# Patient Record
Sex: FEMALE | Race: WHITE | NOT HISPANIC OR LATINO | Employment: OTHER | URBAN - METROPOLITAN AREA
[De-identification: names, ages, dates, MRNs, and addresses within clinical notes are randomized per-mention and may not be internally consistent; named-entity substitution may affect disease eponyms.]

---

## 2022-10-27 RX ORDER — GEMFIBROZIL 600 MG/1
600 TABLET, FILM COATED ORAL
Status: ON HOLD | COMMUNITY
Start: 2021-11-11 | End: 2022-11-01

## 2022-10-27 RX ORDER — ROPINIROLE 1 MG/1
1 TABLET, FILM COATED ORAL AT BEDTIME
COMMUNITY
Start: 2022-02-14

## 2022-10-27 RX ORDER — LOSARTAN POTASSIUM 25 MG/1
25 TABLET ORAL DAILY
COMMUNITY
Start: 2022-06-28

## 2022-10-27 RX ORDER — ALBUTEROL SULFATE 90 UG/1
2 AEROSOL, METERED RESPIRATORY (INHALATION) 4 TIMES DAILY PRN
COMMUNITY
Start: 2022-06-26

## 2022-10-27 RX ORDER — LIRAGLUTIDE 6 MG/ML
1.8 INJECTION SUBCUTANEOUS DAILY
COMMUNITY
Start: 2022-10-13

## 2022-10-27 RX ORDER — METOPROLOL SUCCINATE 50 MG/1
50 TABLET, EXTENDED RELEASE ORAL DAILY
COMMUNITY
Start: 2021-11-11

## 2022-10-27 RX ORDER — ESCITALOPRAM OXALATE 10 MG/1
10 TABLET ORAL DAILY
COMMUNITY
Start: 2022-06-28

## 2022-10-30 ENCOUNTER — LAB (OUTPATIENT)
Dept: FAMILY MEDICINE | Facility: CLINIC | Age: 62
End: 2022-10-30
Payer: COMMERCIAL

## 2022-10-30 DIAGNOSIS — Z20.822 ENCOUNTER FOR LABORATORY TESTING FOR COVID-19 VIRUS: ICD-10-CM

## 2022-10-30 LAB — SARS-COV-2 RNA RESP QL NAA+PROBE: NEGATIVE

## 2022-10-30 PROCEDURE — U0005 INFEC AGEN DETEC AMPLI PROBE: HCPCS

## 2022-10-30 PROCEDURE — 99207 PR NO CHARGE LOS: CPT

## 2022-10-30 PROCEDURE — U0003 INFECTIOUS AGENT DETECTION BY NUCLEIC ACID (DNA OR RNA); SEVERE ACUTE RESPIRATORY SYNDROME CORONAVIRUS 2 (SARS-COV-2) (CORONAVIRUS DISEASE [COVID-19]), AMPLIFIED PROBE TECHNIQUE, MAKING USE OF HIGH THROUGHPUT TECHNOLOGIES AS DESCRIBED BY CMS-2020-01-R: HCPCS

## 2022-10-31 ENCOUNTER — TRANSFERRED RECORDS (OUTPATIENT)
Dept: HEALTH INFORMATION MANAGEMENT | Facility: CLINIC | Age: 62
End: 2022-10-31

## 2022-10-31 ENCOUNTER — ANESTHESIA EVENT (OUTPATIENT)
Dept: SURGERY | Facility: CLINIC | Age: 62
End: 2022-10-31
Payer: COMMERCIAL
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
## 2022-11-01 ENCOUNTER — APPOINTMENT (OUTPATIENT)
Dept: GENERAL RADIOLOGY | Facility: CLINIC | Age: 62
End: 2022-11-01
Attending: ORTHOPAEDIC SURGERY
Payer: COMMERCIAL

## 2022-11-01 ENCOUNTER — HOSPITAL ENCOUNTER (OUTPATIENT)
Facility: CLINIC | Age: 62
Discharge: HOME | End: 2022-11-02
Attending: ORTHOPAEDIC SURGERY | Admitting: ORTHOPAEDIC SURGERY
Payer: COMMERCIAL

## 2022-11-01 ENCOUNTER — ANESTHESIA (OUTPATIENT)
Dept: SURGERY | Facility: CLINIC | Age: 62
End: 2022-11-01
Payer: COMMERCIAL

## 2022-11-01 DIAGNOSIS — Z96.641 STATUS POST RIGHT HIP REPLACEMENT: Primary | ICD-10-CM

## 2022-11-01 LAB
CREAT SERPL-MCNC: 0.54 MG/DL (ref 0.51–0.95)
GFR SERPL CREATININE-BSD FRML MDRD: >90 ML/MIN/1.73M2
GLUCOSE BLDC GLUCOMTR-MCNC: 94 MG/DL (ref 70–99)
HOLD SPECIMEN: NORMAL

## 2022-11-01 PROCEDURE — 271N000001 HC OR GENERAL SUPPLY NON-STERILE: Performed by: ORTHOPAEDIC SURGERY

## 2022-11-01 PROCEDURE — 93005 ELECTROCARDIOGRAM TRACING: CPT

## 2022-11-01 PROCEDURE — C1776 JOINT DEVICE (IMPLANTABLE): HCPCS | Performed by: ORTHOPAEDIC SURGERY

## 2022-11-01 PROCEDURE — 250N000013 HC RX MED GY IP 250 OP 250 PS 637: Performed by: ORTHOPAEDIC SURGERY

## 2022-11-01 PROCEDURE — 250N000013 HC RX MED GY IP 250 OP 250 PS 637: Performed by: NURSE ANESTHETIST, CERTIFIED REGISTERED

## 2022-11-01 PROCEDURE — 999N000141 HC STATISTIC PRE-PROCEDURE NURSING ASSESSMENT: Performed by: ORTHOPAEDIC SURGERY

## 2022-11-01 PROCEDURE — 250N000011 HC RX IP 250 OP 636: Performed by: PHYSICIAN ASSISTANT

## 2022-11-01 PROCEDURE — 710N000010 HC RECOVERY PHASE 1, LEVEL 2, PER MIN: Performed by: ORTHOPAEDIC SURGERY

## 2022-11-01 PROCEDURE — 250N000011 HC RX IP 250 OP 636

## 2022-11-01 PROCEDURE — 258N000003 HC RX IP 258 OP 636

## 2022-11-01 PROCEDURE — 370N000017 HC ANESTHESIA TECHNICAL FEE, PER MIN: Performed by: ORTHOPAEDIC SURGERY

## 2022-11-01 PROCEDURE — 250N000009 HC RX 250: Performed by: ORTHOPAEDIC SURGERY

## 2022-11-01 PROCEDURE — 36415 COLL VENOUS BLD VENIPUNCTURE: CPT | Performed by: ORTHOPAEDIC SURGERY

## 2022-11-01 PROCEDURE — 258N000003 HC RX IP 258 OP 636: Performed by: PHYSICIAN ASSISTANT

## 2022-11-01 PROCEDURE — 82962 GLUCOSE BLOOD TEST: CPT

## 2022-11-01 PROCEDURE — 258N000003 HC RX IP 258 OP 636: Performed by: ORTHOPAEDIC SURGERY

## 2022-11-01 PROCEDURE — 250N000013 HC RX MED GY IP 250 OP 250 PS 637

## 2022-11-01 PROCEDURE — 250N000011 HC RX IP 250 OP 636: Performed by: ORTHOPAEDIC SURGERY

## 2022-11-01 PROCEDURE — 360N000077 HC SURGERY LEVEL 4, PER MIN: Performed by: ORTHOPAEDIC SURGERY

## 2022-11-01 PROCEDURE — 82565 ASSAY OF CREATININE: CPT | Performed by: ORTHOPAEDIC SURGERY

## 2022-11-01 PROCEDURE — 85018 HEMOGLOBIN: CPT | Performed by: ORTHOPAEDIC SURGERY

## 2022-11-01 PROCEDURE — 272N000001 HC OR GENERAL SUPPLY STERILE: Performed by: ORTHOPAEDIC SURGERY

## 2022-11-01 PROCEDURE — 250N000013 HC RX MED GY IP 250 OP 250 PS 637: Performed by: PHYSICIAN ASSISTANT

## 2022-11-01 PROCEDURE — 72170 X-RAY EXAM OF PELVIS: CPT

## 2022-11-01 DEVICE — IMP STEM FEM DEPUY ACTIS STD COLLAR TPR SZ 4MM 1010-11-040: Type: IMPLANTABLE DEVICE | Site: HIP | Status: FUNCTIONAL

## 2022-11-01 DEVICE — HEAD FEMORAL 36MM +5MML NECK BIOLOX DELTA CERAMIC HIP 12: Type: IMPLANTABLE DEVICE | Site: HIP | Status: FUNCTIONAL

## 2022-11-01 DEVICE — IMP CUP ACE PINNACLE 52MM 1217-22-052: Type: IMPLANTABLE DEVICE | Site: HIP | Status: FUNCTIONAL

## 2022-11-01 DEVICE — IMP INSERT HIP DEPUY PINNACLE ALTRX 36X52MM 1221-36-052: Type: IMPLANTABLE DEVICE | Site: HIP | Status: FUNCTIONAL

## 2022-11-01 RX ORDER — SODIUM CHLORIDE, SODIUM LACTATE, POTASSIUM CHLORIDE, CALCIUM CHLORIDE 600; 310; 30; 20 MG/100ML; MG/100ML; MG/100ML; MG/100ML
INJECTION, SOLUTION INTRAVENOUS CONTINUOUS
Status: DISCONTINUED | OUTPATIENT
Start: 2022-11-01 | End: 2022-11-01 | Stop reason: HOSPADM

## 2022-11-01 RX ORDER — PROCHLORPERAZINE MALEATE 5 MG
10 TABLET ORAL EVERY 6 HOURS PRN
Status: DISCONTINUED | OUTPATIENT
Start: 2022-11-01 | End: 2022-11-02 | Stop reason: HOSPADM

## 2022-11-01 RX ORDER — ESCITALOPRAM OXALATE 10 MG/1
10 TABLET ORAL DAILY
Status: DISCONTINUED | OUTPATIENT
Start: 2022-11-02 | End: 2022-11-02 | Stop reason: HOSPADM

## 2022-11-01 RX ORDER — ACETAMINOPHEN 325 MG/1
975 TABLET ORAL ONCE
Status: COMPLETED | OUTPATIENT
Start: 2022-11-01 | End: 2022-11-01

## 2022-11-01 RX ORDER — HYDROXYZINE HYDROCHLORIDE 25 MG/1
25 TABLET, FILM COATED ORAL EVERY 6 HOURS PRN
Status: DISCONTINUED | OUTPATIENT
Start: 2022-11-01 | End: 2022-11-02 | Stop reason: HOSPADM

## 2022-11-01 RX ORDER — KETOROLAC TROMETHAMINE 15 MG/ML
15 INJECTION, SOLUTION INTRAMUSCULAR; INTRAVENOUS EVERY 6 HOURS
Status: COMPLETED | OUTPATIENT
Start: 2022-11-01 | End: 2022-11-02

## 2022-11-01 RX ORDER — DEXAMETHASONE SODIUM PHOSPHATE 4 MG/ML
INJECTION, SOLUTION INTRA-ARTICULAR; INTRALESIONAL; INTRAMUSCULAR; INTRAVENOUS; SOFT TISSUE PRN
Status: DISCONTINUED | OUTPATIENT
Start: 2022-11-01 | End: 2022-11-01

## 2022-11-01 RX ORDER — HYDROMORPHONE HCL IN WATER/PF 6 MG/30 ML
0.2 PATIENT CONTROLLED ANALGESIA SYRINGE INTRAVENOUS EVERY 5 MIN PRN
Status: DISCONTINUED | OUTPATIENT
Start: 2022-11-01 | End: 2022-11-01 | Stop reason: HOSPADM

## 2022-11-01 RX ORDER — METOPROLOL SUCCINATE 50 MG/1
50 TABLET, EXTENDED RELEASE ORAL DAILY
Status: DISCONTINUED | OUTPATIENT
Start: 2022-11-02 | End: 2022-11-02 | Stop reason: HOSPADM

## 2022-11-01 RX ORDER — FENTANYL CITRATE 50 UG/ML
25 INJECTION, SOLUTION INTRAMUSCULAR; INTRAVENOUS EVERY 5 MIN PRN
Status: DISCONTINUED | OUTPATIENT
Start: 2022-11-01 | End: 2022-11-01 | Stop reason: HOSPADM

## 2022-11-01 RX ORDER — ACETAMINOPHEN 325 MG/1
650 TABLET ORAL EVERY 4 HOURS PRN
Qty: 100 TABLET | Refills: 0
Start: 2022-11-01

## 2022-11-01 RX ORDER — ONDANSETRON 2 MG/ML
4 INJECTION INTRAMUSCULAR; INTRAVENOUS EVERY 30 MIN PRN
Status: DISCONTINUED | OUTPATIENT
Start: 2022-11-01 | End: 2022-11-01 | Stop reason: HOSPADM

## 2022-11-01 RX ORDER — ACETAMINOPHEN 325 MG/1
975 TABLET ORAL EVERY 8 HOURS
Status: DISCONTINUED | OUTPATIENT
Start: 2022-11-01 | End: 2022-11-02 | Stop reason: HOSPADM

## 2022-11-01 RX ORDER — AMOXICILLIN 250 MG
1 CAPSULE ORAL 2 TIMES DAILY
Status: DISCONTINUED | OUTPATIENT
Start: 2022-11-01 | End: 2022-11-02 | Stop reason: HOSPADM

## 2022-11-01 RX ORDER — CALCIUM CARBONATE 500 MG/1
500 TABLET, CHEWABLE ORAL 4 TIMES DAILY PRN
Status: DISCONTINUED | OUTPATIENT
Start: 2022-11-01 | End: 2022-11-02 | Stop reason: HOSPADM

## 2022-11-01 RX ORDER — ROPINIROLE 1 MG/1
1 TABLET, FILM COATED ORAL AT BEDTIME
Status: DISCONTINUED | OUTPATIENT
Start: 2022-11-01 | End: 2022-11-02 | Stop reason: HOSPADM

## 2022-11-01 RX ORDER — OXYCODONE HYDROCHLORIDE 5 MG/1
5-10 TABLET ORAL EVERY 4 HOURS PRN
Qty: 26 TABLET | Refills: 0 | Status: SHIPPED | OUTPATIENT
Start: 2022-11-01

## 2022-11-01 RX ORDER — SODIUM CHLORIDE, SODIUM LACTATE, POTASSIUM CHLORIDE, CALCIUM CHLORIDE 600; 310; 30; 20 MG/100ML; MG/100ML; MG/100ML; MG/100ML
INJECTION, SOLUTION INTRAVENOUS CONTINUOUS
Status: DISCONTINUED | OUTPATIENT
Start: 2022-11-01 | End: 2022-11-02 | Stop reason: HOSPADM

## 2022-11-01 RX ORDER — CEFAZOLIN SODIUM 2 G/100ML
2 INJECTION, SOLUTION INTRAVENOUS EVERY 8 HOURS
Status: COMPLETED | OUTPATIENT
Start: 2022-11-01 | End: 2022-11-02

## 2022-11-01 RX ORDER — CEFAZOLIN SODIUM/WATER 2 G/20 ML
2 SYRINGE (ML) INTRAVENOUS
Status: COMPLETED | OUTPATIENT
Start: 2022-11-01 | End: 2022-11-01

## 2022-11-01 RX ORDER — BUPIVACAINE HYDROCHLORIDE 7.5 MG/ML
INJECTION, SOLUTION INTRASPINAL
Status: COMPLETED | OUTPATIENT
Start: 2022-11-01 | End: 2022-11-01

## 2022-11-01 RX ORDER — ONDANSETRON 4 MG/1
4 TABLET, ORALLY DISINTEGRATING ORAL EVERY 30 MIN PRN
Status: DISCONTINUED | OUTPATIENT
Start: 2022-11-01 | End: 2022-11-01 | Stop reason: HOSPADM

## 2022-11-01 RX ORDER — BUPIVACAINE HYDROCHLORIDE 5 MG/ML
INJECTION, SOLUTION PERINEURAL PRN
Status: DISCONTINUED | OUTPATIENT
Start: 2022-11-01 | End: 2022-11-01 | Stop reason: HOSPADM

## 2022-11-01 RX ORDER — HYDROXYZINE HYDROCHLORIDE 25 MG/1
25 TABLET, FILM COATED ORAL EVERY 6 HOURS PRN
Qty: 30 TABLET | Refills: 0 | Status: SHIPPED | OUTPATIENT
Start: 2022-11-01

## 2022-11-01 RX ORDER — ONDANSETRON 4 MG/1
4 TABLET, ORALLY DISINTEGRATING ORAL EVERY 6 HOURS PRN
Status: DISCONTINUED | OUTPATIENT
Start: 2022-11-01 | End: 2022-11-02 | Stop reason: HOSPADM

## 2022-11-01 RX ORDER — LIDOCAINE 40 MG/G
CREAM TOPICAL
Status: DISCONTINUED | OUTPATIENT
Start: 2022-11-01 | End: 2022-11-02 | Stop reason: HOSPADM

## 2022-11-01 RX ORDER — MEPERIDINE HYDROCHLORIDE 25 MG/ML
12.5 INJECTION INTRAMUSCULAR; INTRAVENOUS; SUBCUTANEOUS
Status: DISCONTINUED | OUTPATIENT
Start: 2022-11-01 | End: 2022-11-01 | Stop reason: HOSPADM

## 2022-11-01 RX ORDER — POLYETHYLENE GLYCOL 3350 17 G/17G
17 POWDER, FOR SOLUTION ORAL DAILY
Status: DISCONTINUED | OUTPATIENT
Start: 2022-11-02 | End: 2022-11-02 | Stop reason: HOSPADM

## 2022-11-01 RX ORDER — LIRAGLUTIDE 6 MG/ML
1.8 INJECTION SUBCUTANEOUS DAILY
Status: DISCONTINUED | OUTPATIENT
Start: 2022-11-02 | End: 2022-11-02 | Stop reason: HOSPADM

## 2022-11-01 RX ORDER — NALOXONE HYDROCHLORIDE 0.4 MG/ML
0.2 INJECTION, SOLUTION INTRAMUSCULAR; INTRAVENOUS; SUBCUTANEOUS
Status: DISCONTINUED | OUTPATIENT
Start: 2022-11-01 | End: 2022-11-02 | Stop reason: HOSPADM

## 2022-11-01 RX ORDER — HYDROMORPHONE HCL IN WATER/PF 6 MG/30 ML
0.4 PATIENT CONTROLLED ANALGESIA SYRINGE INTRAVENOUS
Status: DISCONTINUED | OUTPATIENT
Start: 2022-11-01 | End: 2022-11-02 | Stop reason: HOSPADM

## 2022-11-01 RX ORDER — CEFAZOLIN SODIUM/WATER 2 G/20 ML
2 SYRINGE (ML) INTRAVENOUS SEE ADMIN INSTRUCTIONS
Status: DISCONTINUED | OUTPATIENT
Start: 2022-11-01 | End: 2022-11-01 | Stop reason: HOSPADM

## 2022-11-01 RX ORDER — GABAPENTIN 300 MG/1
300 CAPSULE ORAL
Status: COMPLETED | OUTPATIENT
Start: 2022-11-01 | End: 2022-11-01

## 2022-11-01 RX ORDER — LOSARTAN POTASSIUM 25 MG/1
25 TABLET ORAL DAILY
Status: DISCONTINUED | OUTPATIENT
Start: 2022-11-02 | End: 2022-11-02 | Stop reason: HOSPADM

## 2022-11-01 RX ORDER — NALOXONE HYDROCHLORIDE 0.4 MG/ML
0.4 INJECTION, SOLUTION INTRAMUSCULAR; INTRAVENOUS; SUBCUTANEOUS
Status: DISCONTINUED | OUTPATIENT
Start: 2022-11-01 | End: 2022-11-02 | Stop reason: HOSPADM

## 2022-11-01 RX ORDER — LIDOCAINE 40 MG/G
CREAM TOPICAL
Status: DISCONTINUED | OUTPATIENT
Start: 2022-11-01 | End: 2022-11-01 | Stop reason: HOSPADM

## 2022-11-01 RX ORDER — HYDROMORPHONE HCL IN WATER/PF 6 MG/30 ML
0.2 PATIENT CONTROLLED ANALGESIA SYRINGE INTRAVENOUS
Status: DISCONTINUED | OUTPATIENT
Start: 2022-11-01 | End: 2022-11-02 | Stop reason: HOSPADM

## 2022-11-01 RX ORDER — ONDANSETRON 2 MG/ML
INJECTION INTRAMUSCULAR; INTRAVENOUS PRN
Status: DISCONTINUED | OUTPATIENT
Start: 2022-11-01 | End: 2022-11-01

## 2022-11-01 RX ORDER — BISACODYL 10 MG
10 SUPPOSITORY, RECTAL RECTAL DAILY PRN
Status: DISCONTINUED | OUTPATIENT
Start: 2022-11-01 | End: 2022-11-02 | Stop reason: HOSPADM

## 2022-11-01 RX ORDER — AMOXICILLIN 250 MG
1-2 CAPSULE ORAL 2 TIMES DAILY PRN
Qty: 30 TABLET | Refills: 0 | Status: SHIPPED | OUTPATIENT
Start: 2022-11-01

## 2022-11-01 RX ORDER — FAMOTIDINE 20 MG/1
20 TABLET, FILM COATED ORAL 2 TIMES DAILY
Status: DISCONTINUED | OUTPATIENT
Start: 2022-11-01 | End: 2022-11-02 | Stop reason: HOSPADM

## 2022-11-01 RX ORDER — GLUCOSAMINE HCL/CHONDROITIN SU 500-400 MG
CAPSULE ORAL DAILY
COMMUNITY

## 2022-11-01 RX ORDER — FENTANYL CITRATE 50 UG/ML
25 INJECTION, SOLUTION INTRAMUSCULAR; INTRAVENOUS
Status: CANCELLED | OUTPATIENT
Start: 2022-11-01

## 2022-11-01 RX ORDER — OXYCODONE HYDROCHLORIDE 5 MG/1
10 TABLET ORAL EVERY 4 HOURS PRN
Status: DISCONTINUED | OUTPATIENT
Start: 2022-11-01 | End: 2022-11-02 | Stop reason: HOSPADM

## 2022-11-01 RX ORDER — ACETAMINOPHEN 325 MG/1
650 TABLET ORAL EVERY 4 HOURS PRN
Status: DISCONTINUED | OUTPATIENT
Start: 2022-11-04 | End: 2022-11-02 | Stop reason: HOSPADM

## 2022-11-01 RX ORDER — OXYCODONE HYDROCHLORIDE 5 MG/1
5 TABLET ORAL EVERY 4 HOURS PRN
Status: DISCONTINUED | OUTPATIENT
Start: 2022-11-01 | End: 2022-11-01 | Stop reason: HOSPADM

## 2022-11-01 RX ORDER — ONDANSETRON 2 MG/ML
4 INJECTION INTRAMUSCULAR; INTRAVENOUS EVERY 6 HOURS PRN
Status: DISCONTINUED | OUTPATIENT
Start: 2022-11-01 | End: 2022-11-02 | Stop reason: HOSPADM

## 2022-11-01 RX ORDER — TRANEXAMIC ACID 650 MG/1
1950 TABLET ORAL ONCE
Status: COMPLETED | OUTPATIENT
Start: 2022-11-01 | End: 2022-11-01

## 2022-11-01 RX ORDER — PROPOFOL 10 MG/ML
INJECTION, EMULSION INTRAVENOUS CONTINUOUS PRN
Status: DISCONTINUED | OUTPATIENT
Start: 2022-11-01 | End: 2022-11-01

## 2022-11-01 RX ORDER — OXYCODONE HYDROCHLORIDE 5 MG/1
5 TABLET ORAL EVERY 4 HOURS PRN
Status: DISCONTINUED | OUTPATIENT
Start: 2022-11-01 | End: 2022-11-02 | Stop reason: HOSPADM

## 2022-11-01 RX ADMIN — SODIUM CHLORIDE, POTASSIUM CHLORIDE, SODIUM LACTATE AND CALCIUM CHLORIDE 1000 ML: 600; 310; 30; 20 INJECTION, SOLUTION INTRAVENOUS at 22:07

## 2022-11-01 RX ADMIN — ASPIRIN 325 MG: 325 TABLET, COATED ORAL at 17:34

## 2022-11-01 RX ADMIN — PROCHLORPERAZINE EDISYLATE 10 MG: 5 INJECTION INTRAMUSCULAR; INTRAVENOUS at 21:49

## 2022-11-01 RX ADMIN — SODIUM CHLORIDE, POTASSIUM CHLORIDE, SODIUM LACTATE AND CALCIUM CHLORIDE: 600; 310; 30; 20 INJECTION, SOLUTION INTRAVENOUS at 22:42

## 2022-11-01 RX ADMIN — PHENYLEPHRINE HYDROCHLORIDE 100 MCG: 10 INJECTION INTRAVENOUS at 16:03

## 2022-11-01 RX ADMIN — BUPIVACAINE HYDROCHLORIDE IN DEXTROSE 1.5 ML: 7.5 INJECTION, SOLUTION SUBARACHNOID at 15:15

## 2022-11-01 RX ADMIN — SODIUM CHLORIDE, POTASSIUM CHLORIDE, SODIUM LACTATE AND CALCIUM CHLORIDE: 600; 310; 30; 20 INJECTION, SOLUTION INTRAVENOUS at 17:29

## 2022-11-01 RX ADMIN — HYDROXYZINE HYDROCHLORIDE 25 MG: 25 TABLET, FILM COATED ORAL at 20:59

## 2022-11-01 RX ADMIN — PHENYLEPHRINE HYDROCHLORIDE 100 MCG: 10 INJECTION INTRAVENOUS at 16:09

## 2022-11-01 RX ADMIN — FAMOTIDINE 20 MG: 20 TABLET, FILM COATED ORAL at 20:14

## 2022-11-01 RX ADMIN — GABAPENTIN 300 MG: 300 CAPSULE ORAL at 14:15

## 2022-11-01 RX ADMIN — SENNOSIDES AND DOCUSATE SODIUM 1 TABLET: 50; 8.6 TABLET ORAL at 20:14

## 2022-11-01 RX ADMIN — PHENYLEPHRINE HYDROCHLORIDE 100 MCG: 10 INJECTION INTRAVENOUS at 15:57

## 2022-11-01 RX ADMIN — ACETAMINOPHEN 975 MG: 325 TABLET, FILM COATED ORAL at 14:15

## 2022-11-01 RX ADMIN — CEFAZOLIN SODIUM 2 G: 2 INJECTION, SOLUTION INTRAVENOUS at 23:16

## 2022-11-01 RX ADMIN — PROPOFOL 150 MCG/KG/MIN: 10 INJECTION, EMULSION INTRAVENOUS at 15:20

## 2022-11-01 RX ADMIN — MIDAZOLAM 2 MG: 1 INJECTION INTRAMUSCULAR; INTRAVENOUS at 15:13

## 2022-11-01 RX ADMIN — DEXAMETHASONE SODIUM PHOSPHATE 4 MG: 4 INJECTION, SOLUTION INTRA-ARTICULAR; INTRALESIONAL; INTRAMUSCULAR; INTRAVENOUS; SOFT TISSUE at 15:50

## 2022-11-01 RX ADMIN — ROPINIROLE HYDROCHLORIDE 1 MG: 1 TABLET, FILM COATED ORAL at 21:07

## 2022-11-01 RX ADMIN — PHENYLEPHRINE HYDROCHLORIDE 100 MCG: 10 INJECTION INTRAVENOUS at 15:47

## 2022-11-01 RX ADMIN — MIDAZOLAM 2 MG: 1 INJECTION INTRAMUSCULAR; INTRAVENOUS at 15:08

## 2022-11-01 RX ADMIN — TRANEXAMIC ACID 1950 MG: 650 TABLET ORAL at 14:18

## 2022-11-01 RX ADMIN — Medication 2 G: at 15:11

## 2022-11-01 RX ADMIN — ONDANSETRON 4 MG: 2 INJECTION INTRAMUSCULAR; INTRAVENOUS at 21:00

## 2022-11-01 RX ADMIN — PHENYLEPHRINE HYDROCHLORIDE 200 MCG: 10 INJECTION INTRAVENOUS at 15:15

## 2022-11-01 RX ADMIN — ACETAMINOPHEN 975 MG: 325 TABLET, FILM COATED ORAL at 17:34

## 2022-11-01 RX ADMIN — PHENYLEPHRINE HYDROCHLORIDE 100 MCG: 10 INJECTION INTRAVENOUS at 15:29

## 2022-11-01 RX ADMIN — KETOROLAC TROMETHAMINE 15 MG: 15 INJECTION, SOLUTION INTRAMUSCULAR; INTRAVENOUS at 17:34

## 2022-11-01 RX ADMIN — ONDANSETRON 4 MG: 2 INJECTION INTRAMUSCULAR; INTRAVENOUS at 15:50

## 2022-11-01 RX ADMIN — KETOROLAC TROMETHAMINE 15 MG: 15 INJECTION, SOLUTION INTRAMUSCULAR; INTRAVENOUS at 23:16

## 2022-11-01 RX ADMIN — OXYCODONE HYDROCHLORIDE 5 MG: 5 TABLET ORAL at 19:02

## 2022-11-01 ASSESSMENT — ACTIVITIES OF DAILY LIVING (ADL)
ADLS_ACUITY_SCORE: 24
ADLS_ACUITY_SCORE: 18

## 2022-11-01 NOTE — PROGRESS NOTES
Pt. Transferred by cart on room air with nursing assistant at 1700 to room 2313,  called updated on poc Scott Sturges, RN on 11/1/2022 at 5:03 PM

## 2022-11-01 NOTE — PROCEDURES
11/01/22    PREOP DIAGNOSIS: Right hip arthritis  POSTOP DIAGNOSIS: Right hip arthritis  PROCEDURE: Right total hip arthroplasty  SURGEON: Richard Velázquez   ASSISTANT: Minor Cavazos.  The presence of a PA was necessary for positioning, retraction, and safe progression of the case  ANESTHESIA: Spinal with sedation   EBL: 200cc  COMPLICATIONS: None apparent   DISPO: Stable to PACU     IMPLANTS: DePuy Actis size 4 standard offset collared femur, 36mm x 5mm ceramic femoral head, 36mm x 52mm neutral polyethylene liner, and 52mm Jefferson Cup    INDICATIONS: Jacky is a 62 year old female with a history of progressive right hip pain due to end stage arthritis.  After failing nonoperative management, she elected to proceed with a right RUBIN, understanding the risks of infection, damage to vessels or nerves, blood clots, instability, leg length discrepancy, ongoing pain and need for revision surgery.     PROCEDURE: Jacky was met in the preoperative holding area where consent was reviewed and the right hip was marked.  She was then transferred to the operating theater. After a spinal anesthetic was placed, se was placed in a left lateral decubitus position with her right side up. All bony prominences were padded, she was secured with a Stulberg hip positioner, and an axillary roll was placed.  A timeout was performed verifying the correct patient, surgery, and location. She received preoperative antibiotics as well as transexamic acid. The right lower extremity was then prepped and draped in a standard fashion.     We then made an incision in line with a posterior approach to the hip. Dissection was sharply carried down through skin and subcutaneous tissue, and the gluteus fascia incised in line with the incision. After palpating and protecting the sciatic nerve, an east west retractor was placed. We then released the piriformis and short external rotators and then performed a T Capsulotomy. The hip was dislocated and a neck  cut made, approximately 8 mm proximal to the lesser trochanter. The femoral head measured 47mm and showed full thickness chondral wear.    We turned our attention to the acetabulum. After placing anterior and posterior retractors, foveal and labral tissue were removed.  We started with a 47mm reamer and sequentially reamed to a 52, in approximately 40 deg of abduction and 25 deg of anteversion. At that point, we had good bleeding bone circumferentially.  A trial cup was placed with good pressfit followed by our final 52mm cup, again with good press fit, and a neutral liner. We then turned our attention to the femur. After using a cookie cutter and canal finder, we broached to a size 4, keeping the stem in approximatley 10-15 deg of anteversion. At that point, we had excellent rotational stability.  We then placed a standard offset neck with and a variety of different length femoral heads.  We felt the +5mm head most appropriate which showed leg lengths felt appropriate, the hip was stable in full extension and maximal external rotation, in the sleeping position, and with flexion to 90 degrees and internal rotation to 80 degrees.     We then removed all trial components and thoroughly irrigated the wound. We placed our final size 4 stem.  We impacted our final head. The wound was instilled with a dilute betadine solution. Capsule and short external rotators were repaired with 0-ethibond, gluteus fascia with #1 stratafix, subdermal sutures with 2-0 vicryl, and a running 3-0 subcuticular monocryl. A sterile dressing followed by an abductor pillow was placed and the patient was transferred to the PACU in stable condition.       POSTOPERATIVE PLAN:   1. WBAT right LE with PT for mobilization  2. DVT prophylaxis: ASA 325mg daily x 30 days  3. Perioperative antibiotics   4. Follow up in two weeks for a wound check, sooner with questions or concerns    Richard Velázquez MD

## 2022-11-01 NOTE — INTERVAL H&P NOTE
"I have reviewed the surgical (or preoperative) H&P that is linked to this encounter, and examined the patient. There are no significant changes    Clinical Conditions Present on Arrival:  Clinically Significant Risk Factors Present on Admission                    # Obesity: Estimated body mass index is 33.13 kg/m  as calculated from the following:    Height as of this encounter: 1.626 m (5' 4\").    Weight as of this encounter: 87.5 kg (193 lb).       "

## 2022-11-01 NOTE — ANESTHESIA POSTPROCEDURE EVALUATION
Patient: Jacky Mahan    Procedure: Procedure(s):  right Total hip arthroplasty       Anesthesia Type:  Spinal    Note:  Disposition: Outpatient   Postop Pain Control: Uneventful            Sign Out: Well controlled pain   PONV: No   Neuro/Psych: Uneventful            Sign Out: Acceptable/Baseline neuro status   Airway/Respiratory: Uneventful            Sign Out: Acceptable/Baseline resp. status   CV/Hemodynamics: Uneventful            Sign Out: Acceptable CV status; No obvious hypovolemia; No obvious fluid overload   Other NRE: NONE   DID A NON-ROUTINE EVENT OCCUR? No           Last vitals:  Vitals Value Taken Time   /86 11/01/22 1645   Temp 36.5  C (97.7  F) 11/01/22 1645   Pulse 85 11/01/22 1653   Resp 14 11/01/22 1653   SpO2 96 % 11/01/22 1653   Vitals shown include unvalidated device data.    Electronically Signed By: SARA Baez CRNA  November 1, 2022  4:58 PM

## 2022-11-01 NOTE — ANESTHESIA CARE TRANSFER NOTE
Patient: Jacky Mahan    Procedure: Procedure(s):  right Total hip arthroplasty       Diagnosis: Arthritis of right hip [M16.11]  Diagnosis Additional Information: No value filed.    Anesthesia Type:   Spinal     Note:    Oropharynx: oropharynx clear of all foreign objects  Level of Consciousness: awake      Independent Airway: airway patency satisfactory and stable  Dentition: dentition unchanged  Vital Signs Stable: post-procedure vital signs reviewed and stable  Report to RN Given: handoff report given  Patient transferred to: PACU    Handoff Report: Identifed the Patient, Identified the Reponsible Provider, Reviewed the pertinent medical history, Discussed the surgical course, Reviewed Intra-OP anesthesia mangement and issues during anesthesia, Set expectations for post-procedure period and Allowed opportunity for questions and acknowledgement of understanding      Vitals:  Vitals Value Taken Time   /86 11/01/22 1645   Temp 36.5  C (97.7  F) 11/01/22 1645   Pulse 85 11/01/22 1653   Resp 14 11/01/22 1653   SpO2 96 % 11/01/22 1653   Vitals shown include unvalidated device data.    Electronically Signed By: SARA Baez CRNA  November 1, 2022  4:58 PM

## 2022-11-01 NOTE — PROGRESS NOTES
"[facility] ADMISSION NOTE    Patient admitted to room 2313 at approximately 1730 via cart from surgery. Patient was accompanied by spouse.     Verbal SBAR report received from THAIS Marmolejo prior to patient arrival.     Patient trasferred to bed via air barry. Patient alert and oriented X 3. Pain is controlled with current analgesics.  Medication(s) being used: spinal intact.  . Admission vital signs: Blood pressure (!) 153/89, temperature 97.7  F (36.5  C), temperature source Temporal, resp. rate 16, height 1.626 m (5' 4\"), weight 87.5 kg (193 lb), SpO2 97 %. Patient was oriented to plan of care, call light, bed controls, tv, telephone, bathroom and visiting hours.     Risk Assessment    The following safety risks were identified during admission: fall. Yellow risk band applied: YES.     Skin Initial Assessment    This writer admitted this patient and completed a full skin assessment and Enrique score in the Adult PCS flowsheet. Appropriate interventions initiated as needed.         Enrique Risk Assessment  Sensory Perception: 4-->no impairment  Moisture: 3-->occasionally moist  Activity: 1-->bedfast  Mobility: 2-->very limited  Nutrition: 3-->adequate  Friction and Shear: 2-->potential problem  Enrique Score: 15  Sensory/Activity/Mobility Interventions: Turn: left/right positioning, Pillows between bony prominence  Moisture Interventions: Incontinence pad  Friction/Shear Interventions: HOB 30 degrees or less  Mattress: Standard Hospital Mattress (Foam)  Bed Frame: Standard width and length    Education    Patient has a [place] to Observation order: Yes  Observation education completed and documented: Yes      Onelia Hernandez RN    "

## 2022-11-01 NOTE — PROVIDER NOTIFICATION
11/01/22 1406   Discharge Planning   Patient/Family Anticipates Transition to home with family   Concerns to be Addressed all concerns addressed in this encounter   Living Arrangements   People in Home spouse   Type of Residence Private Residence   Is your private residence a single family home or apartment? Single family home   Number of Stairs, Within Home, Primary two   Stair Railings, Within Home, Primary none   Once home, are you able to live on one level? Yes   Which level? Main Level   Which rooms are not on the main level? Bedroom;Bathroom;Kitchen;Living Room   Bathroom Shower/Tub Tub/Shower unit   Equipment Currently Used at Home raised toilet seat;walker, standard   Support System   Support Systems Spouse/Significant Other   Do you have someone available to stay with you one or two nights once you are home? Yes   Medical Clearance   Date of Physical 10/31/22   It is recommended that you call and check with any specialty providers before surgery to see if you need surgical clearance.  Do you see any specialty providers outside of your primary care provider? No   Blood   Known bleeding disorder or coagulopathy? No   Does the patient have any Confucianism/cultural preferences related to blood products? No   Education   Has the patient scheduled or completed pre-op total joint education, either in class or online, in the last 12 months? No   Relationship/Environment   Name(s) of People in Home Srinivasan

## 2022-11-01 NOTE — ANESTHESIA PROCEDURE NOTES
"Intrathecal injection Procedure Note    Pre-Procedure   Staff -        CRNA: Jason Chao APRN CRNA       Performed By: CRNA       Procedure Start/Stop Times: 11/1/2022 3:10 PM and 11/1/2022 3:20 PM       Pre-Anesthestic Checklist: patient identified, IV checked, risks and benefits discussed, informed consent, monitors and equipment checked, pre-op evaluation, at physician/surgeon's request and post-op pain management  Timeout:       Correct Patient: Yes        Correct Procedure: Yes        Correct Site: Yes        Correct Position: Yes   Procedure Documentation  Procedure: intrathecal injection       Patient Position: sitting       Skin prep: Chloraprep       Insertion Site: L4-5. (midline approach).       Needle Gauge: 22.        Needle Length (Inches): 3        Spinal Needle Type: QuinckeNo introducer used       # of attempts: 1 and  # of redirects:  0    Assessment/Narrative         Paresthesias: No.       CSF fluid: clear.       Opening pressure was cmH2O while  Sitting.      Medication(s) Administered   0.75% Hyperbaric Bupivacaine (Intrathecal) - Intrathecal   1.5 mL - 11/1/2022 3:15:00 PM  Medication Administration Time: 11/1/2022 3:10 PM      FOR [facility] ([facility]) ONLY:   Pain Team Contact information: please page the Pain Team Via [employer]. Search \"Pain\". During daytime hours, please page the attending first. At night please page the resident first.    "

## 2022-11-02 ENCOUNTER — APPOINTMENT (OUTPATIENT)
Dept: PHYSICAL THERAPY | Facility: CLINIC | Age: 62
End: 2022-11-02
Attending: ORTHOPAEDIC SURGERY
Payer: COMMERCIAL

## 2022-11-02 VITALS
WEIGHT: 193 LBS | RESPIRATION RATE: 13 BRPM | SYSTOLIC BLOOD PRESSURE: 120 MMHG | HEART RATE: 96 BPM | OXYGEN SATURATION: 93 % | DIASTOLIC BLOOD PRESSURE: 71 MMHG | HEIGHT: 64 IN | TEMPERATURE: 98.7 F | BODY MASS INDEX: 32.95 KG/M2

## 2022-11-02 PROBLEM — E11.42 TYPE 2 DIABETES MELLITUS WITH DIABETIC POLYNEUROPATHY, WITHOUT LONG-TERM CURRENT USE OF INSULIN (H): Status: ACTIVE | Noted: 2022-06-28

## 2022-11-02 PROBLEM — Z96.641 S/P HIP REPLACEMENT, RIGHT: Status: ACTIVE | Noted: 2022-11-02

## 2022-11-02 LAB
GLUCOSE BLDC GLUCOMTR-MCNC: 120 MG/DL (ref 70–99)
GLUCOSE BLDC GLUCOMTR-MCNC: 124 MG/DL (ref 70–99)
HGB BLD-MCNC: 11.8 G/DL (ref 11.7–15.7)

## 2022-11-02 PROCEDURE — 97161 PT EVAL LOW COMPLEX 20 MIN: CPT | Mod: GP | Performed by: PHYSICAL THERAPIST

## 2022-11-02 PROCEDURE — 97116 GAIT TRAINING THERAPY: CPT | Mod: GP | Performed by: PHYSICAL THERAPIST

## 2022-11-02 PROCEDURE — 250N000011 HC RX IP 250 OP 636: Performed by: ORTHOPAEDIC SURGERY

## 2022-11-02 PROCEDURE — 999N000111 HC STATISTIC OT IP EVAL DEFER

## 2022-11-02 PROCEDURE — 82962 GLUCOSE BLOOD TEST: CPT | Mod: 91

## 2022-11-02 PROCEDURE — 99213 OFFICE O/P EST LOW 20 MIN: CPT | Performed by: PHYSICIAN ASSISTANT

## 2022-11-02 PROCEDURE — 250N000013 HC RX MED GY IP 250 OP 250 PS 637: Performed by: ORTHOPAEDIC SURGERY

## 2022-11-02 PROCEDURE — 250N000013 HC RX MED GY IP 250 OP 250 PS 637

## 2022-11-02 PROCEDURE — 97110 THERAPEUTIC EXERCISES: CPT | Mod: GP | Performed by: PHYSICAL THERAPIST

## 2022-11-02 RX ORDER — NICOTINE POLACRILEX 4 MG
15-30 LOZENGE BUCCAL
Status: DISCONTINUED | OUTPATIENT
Start: 2022-11-02 | End: 2022-11-02 | Stop reason: HOSPADM

## 2022-11-02 RX ORDER — DEXTROSE MONOHYDRATE 25 G/50ML
25-50 INJECTION, SOLUTION INTRAVENOUS
Status: DISCONTINUED | OUTPATIENT
Start: 2022-11-02 | End: 2022-11-02 | Stop reason: HOSPADM

## 2022-11-02 RX ADMIN — ACETAMINOPHEN 975 MG: 325 TABLET, FILM COATED ORAL at 01:33

## 2022-11-02 RX ADMIN — CEFAZOLIN SODIUM 2 G: 2 INJECTION, SOLUTION INTRAVENOUS at 07:16

## 2022-11-02 RX ADMIN — KETOROLAC TROMETHAMINE 15 MG: 15 INJECTION, SOLUTION INTRAMUSCULAR; INTRAVENOUS at 05:32

## 2022-11-02 RX ADMIN — OXYCODONE HYDROCHLORIDE 5 MG: 5 TABLET ORAL at 09:02

## 2022-11-02 RX ADMIN — KETOROLAC TROMETHAMINE 15 MG: 15 INJECTION, SOLUTION INTRAMUSCULAR; INTRAVENOUS at 12:02

## 2022-11-02 RX ADMIN — METOPROLOL SUCCINATE 50 MG: 50 TABLET, EXTENDED RELEASE ORAL at 09:01

## 2022-11-02 RX ADMIN — HYDROXYZINE HYDROCHLORIDE 25 MG: 25 TABLET, FILM COATED ORAL at 09:02

## 2022-11-02 RX ADMIN — LOSARTAN POTASSIUM 25 MG: 25 TABLET, FILM COATED ORAL at 09:00

## 2022-11-02 RX ADMIN — OXYCODONE HYDROCHLORIDE 5 MG: 5 TABLET ORAL at 12:02

## 2022-11-02 RX ADMIN — ACETAMINOPHEN 975 MG: 325 TABLET, FILM COATED ORAL at 09:02

## 2022-11-02 RX ADMIN — METFORMIN HYDROCHLORIDE 1000 MG: 500 TABLET, FILM COATED ORAL at 09:01

## 2022-11-02 RX ADMIN — FAMOTIDINE 20 MG: 20 TABLET, FILM COATED ORAL at 09:01

## 2022-11-02 RX ADMIN — ASPIRIN 325 MG: 325 TABLET, COATED ORAL at 09:01

## 2022-11-02 RX ADMIN — OXYCODONE HYDROCHLORIDE 5 MG: 5 TABLET ORAL at 03:32

## 2022-11-02 RX ADMIN — ESCITALOPRAM OXALATE 10 MG: 10 TABLET ORAL at 09:01

## 2022-11-02 ASSESSMENT — ACTIVITIES OF DAILY LIVING (ADL)
ADLS_ACUITY_SCORE: 24
ADLS_ACUITY_SCORE: 23
ADLS_ACUITY_SCORE: 24

## 2022-11-02 NOTE — PROGRESS NOTES
A rapid response was called around 9:45 pm - patient felt lightheaded, nauseated, and weak after ambulating to the bathroom for the first time after her surgery.  Vitals were checked by RN with stable but slightly soft blood pressure, normal heart rate.     Patient was examined while she was sitting on the toilet.  General - appears pale, diaphoretic.  Pulm - lungs clear to auscultation.  CV - RRR, no murmur.     Suspect she had a vasovagal episode.  Plan:  - Return patient to her bed, try Trendelenburg position  - EKG  - 1L LR bolus  - Vitals check q 15 minutes x 1 hour    Nidhi Brannon PA-C on 11/1/2022 at 9:55 PM   [facility]

## 2022-11-02 NOTE — PROGRESS NOTES
[facility] Medicine Progress Note  Date of Service: 11/02/2022    Assessment & Plan   Jacky Mahan is a 62 year old female who presented on 11/1/2022 for scheduled Procedure(s):  right Total hip arthroplasty by Richard Velázquez MD and is being followed by the hospital medicine service for co-management of acute and/or chronic perioperative medical problems.    S/p Procedure(s):  right Total hip arthroplasty   1 Day Post-Op   Hg 11.8.  Pain adequately managed at present.    - pain control, wound cares, physical therapy, occupational therapy and DVT prophylaxis per orthopedic surgery service    Syncope  POD 0 patient up to the bathroom the first time and had a brief unresponsive episode on the toilet with associated lightheaded nests, nausea and weakness surrounding the event.  Blood pressures were reportedly soft with normal heart rate.  ECG showed NSR with nonspecific T wave flattening.  No comparison available.  Suspected vasovagal episode versus orthostasis.    Blood pressure since then have been normotensive and no near-syncopal symptoms.  -Monitor for recurrent symptoms, okay to discharge this afternoon if remains normotensives and asymptomatic with activity    Hypertension   Chronic. Blood pressures reviewed, stable.   - continue home losartan, metoprolol    Diabetes Mellitus, Type II  Chronic.  Last a1C 7.0% on 9/2022 (OSH labs).  Blood sugars adequately controlled.  -continue home metformin,  -Hold home liraglutide with outpatient status  -Medium SSI  -hypo/hyperglycemia protocol with blood glucose monitoring    Depression and Anxiety  Mood stable.  -Continue home escitalopram    Restless leg syndrome  Exacerbated following surgery.  - continue home ropinirole     Obesity  Body mass index is 33.13 kg/m . Contributes to overall morbidity and mortality.    DVT Prophylaxis: as per orthopedic surgery service - aspirin 325 mg daily  Code Status: Full Code    Lines:  peripheral   Chester catheter: none    Discussion: Medically, the patient has a syncopal episode last night though has been doing well since then. Vital signs stable. Will monitor through lunch to ensure no recurrent near-syncope then okay for discharge from medical perspective.     Disposition: Anticipate discharge today, if remains normotensive and without near-syncopal symptoms and if no orthopedic barriers.      Attestation:  I have reviewed today's vital signs, notes, medications, labs and imaging.  Total time: 15 minutes    Taya Glez PA-C   [facility] Medicine    Supervising Physician Dr. Ana Lilia Whitfield.     Interval History   Feeling well today.  Around 8 PM last night she had uncontrolled pain with muscle spasms and increase in her restless leg symptoms.  Then around 10 PM last night she had a syncopal episode which at the time was felt to be vasovagal.  She had gotten up to go to the bathroom and felt nauseous with lightheadedness.  She then had a brief unresponsive episode on the toilet.  Her blood pressures were noted to be low.  She received 1 L bolus and following that has remained normotensive and without recurrent near syncopal symptoms.  No prior history of syncope or near syncope.  She did well with physical therapy today and met her mobility goals.  Her appetite has been good.  She is passing flatus.  No BM.  Voiding regularly.    Denies headache, lightheadedness, dizziness, fever, chills, chest pain, palpitations, shortness of breath, cough, abdominal pain, nausea, vomiting, numbness or tingling in bilateral lower extremities         Physical Exam   Temp:  [96.9  F (36.1  C)-99  F (37.2  C)] 98.7  F (37.1  C)  Pulse:  [79-96] 96  Resp:  [10-18] 13  BP: (123-180)/() 123/61  SpO2:  [95 %-99 %] 96 %    Weights:   Vitals:    11/01/22 1300   Weight: 87.5 kg (193 lb)    Body mass index is 33.13 kg/m .    General: Appears well, laying down resting in bed. Alert and oriented. Pleasant and  cooperative. No distress. Non-toxic. Appears  stated age.   CV: Regular rate, normal rhythm. Pedal pulses are 2+ bilaterally.  No lower extremity edema.  Respiratory: No accessory muscle usage. Clear to auscultation bilaterally. No wheezes, crackles or rhonchi.   GI: Bowel sounds present. Soft, non-tender, non-distended.  Skin: Warm and dry. Did not assess incision directly, bandage appears clean and dry.  Musculoskeletal: Muscle tone is appropriate. Moves all extremities right lower extremity freely with limited range of motion due to pain and bandage.  Neuro: Sensation to light touch of bilateral lower extremities is grossly intact.     Data   Recent Labs   Lab 11/02/22  0531 11/01/22  1909 11/01/22  1423   HGB  --  11.8  --    CR  --  0.54  --    *  --  94       Recent Labs   Lab 11/02/22  0531 11/01/22  1423   * 94        Unresulted Labs Ordered in the Past 30 Days of this Admission     No orders found for last 31 day(s).           Imaging  Recent Results (from the past 24 hour(s))   XR Pelvis Port 1/2 Views    Narrative    EXAM: XR PELVIS PORT 1/2 VIEWS  LOCATION: [facility]  DATE/TIME: 11/1/2022 4:53 PM    INDICATION: Status post Hip surgery  COMPARISON: None.      Impression    IMPRESSION: Status post recent right total hip arthroplasty. No immediate hardware complication. Expected postsurgical soft tissue edema and subcutaneous emphysema. No acute fracture. Mild widening of the pubic symphysis measuring 1 cm.      I reviewed all new labs and imaging results over the last 24 hours. I personally reviewed the EKG tracing showing NSR with T wave flattening, no comparison available.    Medications     lactated ringers 75 mL/hr at 11/02/22 0336       acetaminophen  975 mg Oral Q8H     aspirin  325 mg Oral Daily     escitalopram  10 mg Oral Daily     famotidine  20 mg Oral BID    Or     famotidine  20 mg Intravenous BID     insulin aspart  1-7 Units Subcutaneous TID AC      insulin aspart  1-5 Units Subcutaneous At Bedtime     ketorolac  15 mg Intravenous Q6H     liraglutide  1.8 mg Subcutaneous Daily     losartan  25 mg Oral Daily     metFORMIN  1,000 mg Oral BID w/meals     metoprolol succinate ER  50 mg Oral Daily     polyethylene glycol  17 g Oral Daily     rOPINIRole  1 mg Oral At Bedtime     senna-docusate  1 tablet Oral BID     sodium chloride (PF)  3 mL Intracatheter Q8H

## 2022-11-02 NOTE — PROGRESS NOTES
"[facility] Orthopaedics Progress Note      Post-operative Day: 1 Day Post-Op    Procedure(s):  right Total hip arthroplasty  Subjective:    Pt reports that her hip feels sore today, but tolerable. She is a little nervous about getting up after her vasovagal episode last night, but is looking forward to PT. She had increased restless leg symptoms with cramping and spasms last night as well but is hopeful that it will be better tonight. She hopes to go home later today and has outpatient physical therapy scheduled.     Chest pain, SOB:  No  Nausea, vomiting:  No  Lightheadedness, dizziness:  No  Neuro:  Patient denies new onset numbness or paresthesias      Objective:  Blood pressure 123/61, pulse 96, temperature 98.7  F (37.1  C), temperature source Oral, resp. rate 13, height 1.626 m (5' 4\"), weight 87.5 kg (193 lb), SpO2 96 %.    Patient Vitals for the past 24 hrs:   BP Temp Temp src Pulse Resp SpO2 Height Weight   11/02/22 0645 123/61 98.7  F (37.1  C) Oral 96 13 96 % -- --   11/02/22 0325 138/63 99  F (37.2  C) Oral 92 15 97 % -- --   11/01/22 2300 134/78 -- -- 85 15 98 % -- --   11/01/22 2245 126/65 -- -- 79 15 97 % -- --   11/01/22 2230 (!) 145/66 -- -- 80 14 95 % -- --   11/01/22 2215 133/70 -- -- -- -- -- -- --   11/01/22 2157 133/68 -- -- 87 12 95 % -- --   11/01/22 1938 (!) 152/85 -- -- 92 16 98 % -- --   11/01/22 1845 (!) 159/90 -- -- 86 16 99 % -- --   11/01/22 1730 (!) 156/94 -- -- 91 -- -- -- --   11/01/22 1716 (!) 153/89 -- -- -- 16 97 % -- --   11/01/22 1650 -- -- -- 87 10 95 % -- --   11/01/22 1645 (!) 141/86 97.7  F (36.5  C) Temporal -- 16 -- -- --   11/01/22 1600 128/70 -- -- -- 16 -- -- --   11/01/22 1428 (!) 141/86 96.9  F (36.1  C) Temporal -- 16 -- -- --   11/01/22 1300 (!) 180/108 99  F (37.2  C) Oral -- 18 97 % 1.626 m (5' 4\") 87.5 kg (193 lb)       Wt Readings from Last 4 Encounters:   11/01/22 87.5 kg (193 lb)       Gen: A&O x 3. NAD. Appears tired. Up in bed.   Wound status: Covered, " Aquacel dressing is c/d/i.  Circulation, motion and sensation: Dorsiflexion/plantarflexion intact and equal bilaterally; distal lower extremity sensation is intact and equal bilaterally. Foot and toes are warm and well perfused.    Swelling: Mild  Calf tenderness: calves are soft and non-tender bilaterally     Pertinent Labs   Lab Results: personally reviewed.     Recent Labs   Lab Test 11/01/22  1909   HGB 11.8       Plan:   Continue current cares and rehabilitation.  Anticoagulation protocol: ASA 325mg daily  x 30  days  Pain medications: oxycodone, toradol, tylenol and vistaril  Weight bearing status:  WBAT  Discussed getting up with PT and taking it slowly, encouraged ongoing fluids.   Disposition:  Plan for discharge to home with outpatient therapy when medically stable and pain is controlled, cleared by therapy. Likely later today, after noon if doing well.              Report completed by:  Cale Zheng PA-C  Date: 11/2/2022  Time: 8:51 AM

## 2022-11-02 NOTE — PROGRESS NOTES
"RRT called while 2 staff were walking with patient to the bathroom for the first time post op after sitting down on the toilet patient yelled, \"Hey guys\" and then was unresponsive for a minute. Patient was pale and clammy. Patient responsive after a minute and has no memory of what happened but stated that she was super nauseated. IV nausea meds given 30 minutes prior to help with nausea. BP soft. Patient did have emesis x3 but afterward stated she felt much better. PA came to bedside. LR bolus running.   "

## 2022-11-02 NOTE — CONSULTS
Care Management Note:    Care Management team received referral from Ortho team to assist pt with Home Care services post surgical services.    Per IDT rounds, EMR review, and/or discussion with PT/OT staff, it has been determined that pt will discharge to home and attend outpatient therapy services.    Care Management will close referral at this time.      KATELYNN Negron  Care Transitions   Tele: [phone]

## 2022-11-02 NOTE — PROGRESS NOTES
[facility] DISCHARGE NOTE    Patient discharged to home at 2:02 PM via wheel chair. Accompanied by spouse and staff. Discharge instructions reviewed with patient and spouse, opportunity offered to ask questions. Prescriptions sent to patients preferred pharmacy. All belongings sent with patient.    Onelia Hernandez RN

## 2022-11-02 NOTE — PROGRESS NOTES
Physical Therapy Initial Inpatient RUBIN Evaluation    Jacky Mahan  [id number]   11/02/22 0845   Appointment Info   Signing Clinician's Name / Credentials (PT) Davida Capone, PT MA #[id number]   Living Environment   People in Home spouse   Current Living Arrangements house   Home Accessibility stairs to enter home   Number of Stairs, Main Entrance 4   Stair Railings, Main Entrance none   Number of Stairs, Within Home, Primary none   Transportation Anticipated family or friend will provide   Living Environment Comments Arranged home, so once inside will be able to be on one level for bed, bath and kitchen   Self-Care   Usual Activity Tolerance good   Current Activity Tolerance moderate   Regular Exercise Yes  (Rides and cares for horses)   Activity/Exercise Type other (see comments)   Exercise Amount/Frequency daily   Equipment Currently Used at Home none   Fall history within last six months yes   Number of times patient has fallen within last six months 1   Activity/Exercise/Self-Care Comment Pt has had progressively painful (R) hip   General Information   Onset of Illness/Injury or Date of Surgery 11/01/22   Referring Physician Richard Velázquez MD   Patient/Family Therapy Goals Statement (PT) Return home, complete OP PT and return to riding and caring for horses   Pertinent History of Current Problem (include personal factors and/or comorbidities that impact the POC) Progressive pain with arthritis in (R) hip. Failed conservative measures.  Completed self-exer at home for pre-op exers with book from [facility].  Did have vasovagal episode last evening with first attempt to ambulate.  Did well with nursing to bedside commode this morning.   Existing Precautions/Restrictions 90 degree hip flexion;no pivoting or twisting;no hip IR;no hip ADD past midline   Weight-Bearing Status - RLE weight-bearing as tolerated   General Observations Cautious habits, but   Cognition   Affect/Mental Status (Cognition) WNL   Orientation Status  "(Cognition) oriented x 4   Follows Commands (Cognition) WNL   Pain Assessment   Patient Currently in Pain   (3/10 sitting at bedside)   Integumentary/Edema   Integumentary/Edema Comments Post surgical wound (R) hip   Posture    Posture Not impaired   Range of Motion (ROM)   ROM Comment (R) knee and ankle WNL;  (R) hip: Flexion = 0-80*; Abd: 0-30*   Strength (Manual Muscle Testing)   Hip, Right (Strength) 3/5 flexion, 3-/5 abd   Knee, Right (Strength) ext = 4-/5, flexion = 4/5   Strength Comments Pain with all MMT   Bed Mobility   Comment, (Bed Mobility) Using \"leg \" appropriately; able to scoot up in bed using arms on rails   Transfers   Comment, (Transfers) WBAT using 2ww, cued to take small steps when pivoting to (R) side; CGA and assist to maneuver IV pole.   Gait/Stairs (Locomotion)   Assistive Device (Gait) walker, front-wheeled   Distance in Feet (Required for LE Total Joints) 12   Distance in Feet (Gait) 12   Pattern (Gait) step-through   Deviations/Abnormal Patterns (Gait) gait speed decreased;ortiz decreased;ataxic;stride length decreased   Maintains Weight-bearing Status (Gait) able to maintain  (WBAT)   Comment, (Gait/Stairs) States (L) wrist pain with pressure d/t IV present   Balance   Balance no deficits were identified   Coordination   Coordination no deficits were identified   Clinical Impression   Criteria for Skilled Therapeutic Intervention Yes, treatment indicated   PT Diagnosis (PT) Decreased mobility status with decreased function at (R) hip   Influenced by the following impairments pain, post op weakness   Functional limitations due to impairments difficulty in amb on level and stairs, transfers, and bed mobility   Clinical Presentation (PT Evaluation Complexity) Stable/Uncomplicated   Clinical Presentation Rationale clinical judgment   Clinical Decision Making (Complexity) low complexity   Planned Therapy Interventions (PT) bed mobility training;gait training;home exercise " program;strengthening;ROM (range of motion)   Anticipated Equipment Needs at Discharge (PT) walker, rolling   Risk & Benefits of therapy have been explained evaluation/treatment results reviewed;care plan/treatment goals reviewed;risks/benefits reviewed;current/potential barriers reviewed;participants voiced agreement with care plan;participants included;patient   Clinical Impression Comments Pt presents with limited mobility following (R) RUBIN on 11/1/22.  Pt plans to return to home with her  this afternoon.  Pt does have a barrier of 4 steps without rail to enter home. Functional level consistent to return home today.   PT Total Evaluation Time   PT Eval, Low Complexity Minutes (96867) 15   Physical Therapy Goals   PT Frequency 2x/day   PT Predicted Duration/Target Date for Goal Attainment 11/05/22   PT Goals Bed Mobility;Transfers;Gait;Stairs   PT: Bed Mobility Modified independent;Supine to/from sit   PT: Transfers Supervision/stand-by assist;Assistive device;Bed to/from chair   PT: Gait Modified independent;Rolling walker;100 feet   PT: Stairs Supervision/stand-by assist;4 stairs;Rail on left   Interventions   Interventions Quick Adds Gait Training;Therapeutic Activity;Therapeutic Procedure   Therapeutic Procedure/Exercise   Ther. Procedure: strength, endurance, ROM, flexibillity Minutes (32629) 10   Symptoms Noted During/After Treatment fatigue;increased pain   Treatment Detail/Skilled Intervention Supine: ankle pumps, heel slides, QS, HS and Glute Sets; hip abd slides, HF SLR with min assist.   Gait Training   Gait Training Minutes (23368) 10   Symptoms Noted During/After Treatment (Gait Training) fatigue   Treatment Detail/Skilled Intervention Ambulation on Level surface   [place] Level (Gait Training) contact guard   Physical Assistance Level (Gait Training) set-up required;verbal cues   Weight Bearing (Gait Training) weight-bearing as tolerated   Assistive Device (Gait Training) rolling walker    Pattern Analysis (Gait Training) swing-through gait   Gait Analysis Deviations decreased ortiz;decreased step length   Impairments (Gait Analysis/Training) pain   PT Discharge Planning   PT Plan Return home today/this afternoon after completing stair training.   PT Discharge Recommendation (DC Rec) home with outpatient physical therapy   PT Rationale for DC Rec Pt   PT Brief overview of current status Pt had vasovagal episode with being on feet on 11/1/22   Total Session Time   Timed Code Treatment Minutes 20   Total Session Time (sum of timed and untimed services) 35

## 2022-11-02 NOTE — PLAN OF CARE
DATE & TIME: 11/2 3-7 AM    Cognitive Concerns/ Orientation : Alert and oriented   BEHAVIOR & AGGRESSION TOOL COLOR: Green, cooperative   ABNL VS/O2: VSS on room air  MOBILITY: Assist of 2, not out of bed this shift due to anxiety over the RRT last time she was up   PAIN MANAGMENT: Oxy once for 5/10 pain, ice applied to right hip, scheduled toradol   DIET: Regular  BOWEL/BLADDER: Continent  ABNL LAB/BG:   DRAIN/DEVICES: PIV with LR at 75  TELEMETRY RHYTHM: n/a   SKIN: Right hip dressing with scant amount of drainage, otherwise CDI   TESTS/PROCEDURES: POD 1 Rt RUBIN

## 2022-11-02 NOTE — DISCHARGE SUMMARY
[facility]ics Discharge Summary                                  [facility]     IRINA CHAIDEZ [id number]   Age: 62 year old  PCP: Mar Ariraga, Raghu 1960     Date of Admission:  11/1/2022  Date of Discharge::  11/2/2022  Discharge Physician:  Cale Zheng PA-C    Code status:  Full Code    Admission Information:  Admission Diagnosis:  Arthritis of right hip [M16.11]  S/P total hip arthroplasty [Z96.649]    Post-Operative Day: 1 Day Post-Op     Reason for admission:  The patient was admitted for the following:Procedure(s) (LRB):  right Total hip arthroplasty (Right)    Principal Problem:    S/P hip replacement, right  Active Problems:    Type 2 diabetes mellitus with diabetic polyneuropathy, without long-term current use of insulin (H)    Essential hypertension    Major depressive disorder, single episode, severe (H)    Mixed hyperlipidemia      Allergies:  Hmg-coa-r inhibitors    Following the procedure noted above the patient was transferred to the post-op floor and started on:    Therapy:  physical therapy  Anticoagulation Plan: ASA 325mg daily for 30 days  Pain Management: oxycodone, vistaril and tylenol  Weight bearing status: Weight bearing as tolerated     The patient was followed and co-managed by the hospitalist service during the inpatient treatment course  Complications:  Syncopal event POD#0, likely vasovagal. Resolved.   Consultations:  None     Pertinent Labs   Lab Results: personally reviewed.     Recent Labs   Lab Test 11/01/22  1909   HGB 11.8          Discharge Information:  Condition at discharge: Stable  Discharge destination:  Discharged to home     Medications at discharge:  Current Discharge Medication List      START taking these medications    Details   acetaminophen (TYLENOL) 325 MG tablet Take 2 tablets (650 mg) by mouth every 4 hours as needed for other (mild pain)  Qty: 100 tablet, Refills: 0    Associated Diagnoses: Status post right hip replacement       aspirin (ASA) 325 MG EC tablet Take 1 tablet (325 mg) by mouth daily  Qty: 30 tablet, Refills: 0    Associated Diagnoses: Status post right hip replacement      hydrOXYzine (ATARAX) 25 MG tablet Take 1 tablet (25 mg) by mouth every 6 hours as needed for itching or anxiety (with pain, moderate pain)  Qty: 30 tablet, Refills: 0    Associated Diagnoses: Status post right hip replacement      oxyCODONE (ROXICODONE) 5 MG tablet Take 1-2 tablets (5-10 mg) by mouth every 4 hours as needed for moderate to severe pain  Qty: 26 tablet, Refills: 0    Associated Diagnoses: Status post right hip replacement      senna-docusate (SENOKOT-S/PERICOLACE) 8.6-50 MG tablet Take 1-2 tablets by mouth 2 times daily as needed for constipation Take while on oral narcotics to prevent or treat constipation.  Qty: 30 tablet, Refills: 0    Comments: While taking narcotics  Associated Diagnoses: Status post right hip replacement         CONTINUE these medications which have NOT CHANGED    Details   albuterol (PROAIR HFA/PROVENTIL HFA/VENTOLIN HFA) 108 (90 Base) MCG/ACT inhaler Inhale 2 puffs into the lungs 4 times daily as needed for wheezing      escitalopram (LEXAPRO) 10 MG tablet Take 10 mg by mouth daily      Glucose Blood (BLOOD GLUCOSE TEST STRIPS) STRP by In Vitro route daily      liraglutide (VICTOZA PEN) 18 MG/3ML solution Inject 1.8 mg Subcutaneous daily      losartan (COZAAR) 25 MG tablet Take 25 mg by mouth daily      metFORMIN (GLUCOPHAGE) 1000 MG tablet Take 1,000 mg by mouth 2 times daily (with meals)      metoprolol succinate ER (TOPROL XL) 50 MG 24 hr tablet Take 50 mg by mouth daily      rOPINIRole (REQUIP) 1 MG tablet Take 1 mg by mouth At Bedtime                        Follow-Up Care:  Patient should be seen in the office in 14 days by the Orthopedic Surgeon/Physician Assistant.  Call [phone] for appointment or questions.    Cale Zheng PA-C

## 2022-11-02 NOTE — PROGRESS NOTES
Physical Therapy Discharge Summary    Reason for therapy discharge:    All goals and outcomes met, no further needs identified.    Progress towards therapy goal(s). See goals on Care Plan in [facility] electronic health record for goal details.  Goals met    Therapy recommendation(s):    Continue home exercise program.  Outpatient PT to start on 11/3/22.

## 2022-11-02 NOTE — PROGRESS NOTES
OT: Per discussion with physical therapy and patient- no IP OT needs identified. Discussed safety with tub/shower transfer- states she plans to sponge bathe for a couple days. Will discontinue OT orders at this time.

## 2022-11-15 NOTE — PROGRESS NOTES
[facility]      OUTPATIENT PHYSICAL THERAPY EVALUATION  PLAN OF TREATMENT FOR OUTPATIENT REHABILITATION  (COMPLETE FOR INITIAL CLAIMS ONLY)  Patient's Last Name, First Name, M.I.  YOB: 1960  Jacky Mahan                        Provider's Name  [facility] Medical Record No.  [id number]                               Onset Date:  11/01/22   Start of Care Date:      11/2/22   Type:     _X_PT   ___OT   ___SLP Medical Diagnosis:                        DJD (R) Hip; RUBIN (R)   PT Diagnosis:  Decreased mobility status with decreased function at (R) hip   Visits from SOC:  1   _________________________________________________________________________________  Plan of Treatment/Functional Goals    Planned Interventions: bed mobility training, gait training, home exercise program, strengthening, ROM (range of motion)     Goals: See Physical Therapy Goals on Care Plan in [facility] electronic health record.    Therapy Frequency: 2x/day  Predicted Duration of Therapy Intervention: 11/05/22  _________________________________________________________________________________    I CERTIFY THE NEED FOR THESE SERVICES FURNISHED UNDER        THIS PLAN OF TREATMENT AND WHILE UNDER MY CARE .             Physician Signature               Date    X_____________________________________________________                   ,      Referring Physician: Richard Velázquez MD            Initial Assessment        See Physical Therapy evaluation dated   in Epic electronic health record.

## (undated) DEVICE — PREP CHLORAPREP 26ML TINTED ORANGE  260815

## (undated) DEVICE — STOCKING SLEEVE COMPRESSION CALF LG

## (undated) DEVICE — SU ETHIBOND 5 V-37 4X30" MB66G

## (undated) DEVICE — DRSG AQUACEL AG 3.5X9.75" HYDROFIBER 412011

## (undated) DEVICE — SU PDO 1 STRATAFIX 36X36CM CTX TAPERPOINT SXPD2B405

## (undated) DEVICE — DEVICE RETRIEVER HEWSON 71111579

## (undated) DEVICE — SUCTION MANIFOLD NEPTUNE 2 SYS 4 PORT 0702-020-000

## (undated) DEVICE — DECANTER VIAL 2006S

## (undated) DEVICE — SYR 20ML LL W/O NDL

## (undated) DEVICE — GLOVE PROTEXIS BLUE W/NEU-THERA 6.5  2D73EB65

## (undated) DEVICE — GLOVE PROTEXIS W/NEU-THERA 8.0  2D73TE80

## (undated) DEVICE — SUCTION IRR SYSTEM W/TIP INTERPULSE

## (undated) DEVICE — BLADE SAW SAGITTAL STRK 18X90X1.37MM HD SYS 6 6118-137-090

## (undated) DEVICE — SU STRATAFIX MONOCRYL 3-0 SPIRAL PS-2 30CM SXMP1B106

## (undated) DEVICE — BONE CLEANING TIP INTERPULSE  0210-010-000

## (undated) DEVICE — DRAPE SHEET REV FOLD 3/4 9349

## (undated) DEVICE — SOL NACL 0.9% IRRIG 3000ML BAG 07972-08

## (undated) DEVICE — SU MONOCRYL 2-0 CT-1 36" UND Y945H

## (undated) DEVICE — ESU PENCIL SMOKE EVAC W/ROCKER SWITCH 0703-047-000

## (undated) DEVICE — SU VICRYL 1 CT-1 36" UND J947H

## (undated) DEVICE — GOWN IMPERVIOUS SPECIALTY XLG/XLONG 32474

## (undated) DEVICE — GLOVE PROTEXIS W/NEU-THERA 6.5  2D73TE65

## (undated) DEVICE — NDL 18GA 1.5" 305196

## (undated) DEVICE — SOL WATER IRRIG 1000ML BOTTLE 07139-09

## (undated) DEVICE — PACK HIP LAKES WITH POUCH

## (undated) DEVICE — GOWN XLG DISP 9545

## (undated) RX ORDER — GABAPENTIN 300 MG/1
CAPSULE ORAL
Status: DISPENSED
Start: 2022-11-01

## (undated) RX ORDER — BUPIVACAINE HYDROCHLORIDE 5 MG/ML
INJECTION, SOLUTION EPIDURAL; INTRACAUDAL
Status: DISPENSED
Start: 2022-11-01

## (undated) RX ORDER — LIDOCAINE HYDROCHLORIDE 10 MG/ML
INJECTION, SOLUTION EPIDURAL; INFILTRATION; INTRACAUDAL; PERINEURAL
Status: DISPENSED
Start: 2022-11-01

## (undated) RX ORDER — FENTANYL CITRATE-0.9 % NACL/PF 10 MCG/ML
PLASTIC BAG, INJECTION (ML) INTRAVENOUS
Status: DISPENSED
Start: 2022-11-01

## (undated) RX ORDER — DEXAMETHASONE SODIUM PHOSPHATE 4 MG/ML
INJECTION, SOLUTION INTRA-ARTICULAR; INTRALESIONAL; INTRAMUSCULAR; INTRAVENOUS; SOFT TISSUE
Status: DISPENSED
Start: 2022-11-01

## (undated) RX ORDER — ONDANSETRON 2 MG/ML
INJECTION INTRAMUSCULAR; INTRAVENOUS
Status: DISPENSED
Start: 2022-11-01

## (undated) RX ORDER — ACETAMINOPHEN 325 MG/1
TABLET ORAL
Status: DISPENSED
Start: 2022-11-01

## (undated) RX ORDER — GLYCOPYRROLATE 0.2 MG/ML
INJECTION, SOLUTION INTRAMUSCULAR; INTRAVENOUS
Status: DISPENSED
Start: 2022-11-01

## (undated) RX ORDER — FENTANYL CITRATE 50 UG/ML
INJECTION, SOLUTION INTRAMUSCULAR; INTRAVENOUS
Status: DISPENSED
Start: 2022-11-01

## (undated) RX ORDER — PROPOFOL 10 MG/ML
INJECTION, EMULSION INTRAVENOUS
Status: DISPENSED
Start: 2022-11-01